# Patient Record
Sex: MALE | Employment: OTHER | URBAN - METROPOLITAN AREA
[De-identification: names, ages, dates, MRNs, and addresses within clinical notes are randomized per-mention and may not be internally consistent; named-entity substitution may affect disease eponyms.]

---

## 2024-02-09 ENCOUNTER — HOSPITAL ENCOUNTER (OUTPATIENT)
Dept: RADIOLOGY | Facility: HOSPITAL | Age: 64
Discharge: HOME | End: 2024-02-09
Payer: MEDICARE

## 2024-02-09 DIAGNOSIS — Z87.891 PERSONAL HISTORY OF NICOTINE DEPENDENCE: ICD-10-CM

## 2024-02-09 PROCEDURE — 71271 CT THORAX LUNG CANCER SCR C-: CPT

## 2024-02-09 NOTE — LETTER
January 9, 2025      Jordan Orosco  [address] [facility] [address]    Dear Jordan Orosco,    RE: Your screening low-dose chest CT done on: 2/9/24  Interpreted by: ADELINA Whitfield MD   Report sent to: Nadine Cruz MD   MRN: [id number]    Thank you for participating in [facility]’s Lung Cancer Screening Program. As you know, early detection of cancer is very important and having a Lung Cancer Screening may improve early detection of lung cancer.  Our records indicate that it is time for your Lung Cancer Screening (Low dose Chest CT). You received your last scan on 2/9/24 which recommended a repeat scan on or around 2/10/2025. If you haven’t already done so, please contact your referring physician or current provider to schedule your next Lung Cancer Screening.    If you have questions or concerns about the contents of this letter, please reach out to your ordering provider’s office or contact the Lung Cancer Screening Coordinator at [phone].     Good lung health involves tobacco cessation and an annual lung cancer screening test according to current guidelines by the Centers for Medicare & Medicaid Services for all current and former smokers age 50 to 77 under the advice of a physician.     Thank you for allowing us to help you meet your health care needs.      Sincerely,    [facility] Department of Radiology  Lung Cancer Screening Program

## 2024-04-26 VITALS — BODY MASS INDEX: 31.83 KG/M2 | HEIGHT: 74 IN | WEIGHT: 248 LBS

## 2024-04-26 NOTE — ADDENDUM NOTE
Encounter addended by: Vic Clark on: 4/26/2024 12:15 PM   Actions taken: Imaging Exam ended, Flowsheet accepted

## 2025-02-05 ENCOUNTER — TELEPHONE (OUTPATIENT)
Dept: RADIOLOGY | Facility: HOSPITAL | Age: 65
End: 2025-02-05
Payer: MEDICARE

## 2025-02-05 NOTE — TELEPHONE ENCOUNTER
Outreach call to this patient to discuss lung cancer screening follow up. He states he is scheduled in February to see his PCP Nadine Cruz. He wishes to proceed with screening and was encouraged to call the office to obtain an order. He is agreeable. Discussed smoking cessation options. He is not ready at this time.